# Patient Record
(demographics unavailable — no encounter records)

---

## 2024-10-24 NOTE — PHYSICAL EXAM
[Well Developed] : well developed [Well Nourished] : well nourished [No Acute Distress] : no acute distress [Normal Conjunctiva] : normal conjunctiva [Normal Venous Pressure] : normal venous pressure [No Carotid Bruit] : no carotid bruit [Normal S1, S2] : normal S1, S2 [No Rub] : no rub [No Gallop] : no gallop [Murmur] : murmur [Clear Lung Fields] : clear lung fields [Good Air Entry] : good air entry [No Respiratory Distress] : no respiratory distress  [Soft] : abdomen soft [Non Tender] : non-tender [No Masses/organomegaly] : no masses/organomegaly [Normal Bowel Sounds] : normal bowel sounds [Normal Gait] : normal gait [No Cyanosis] : no cyanosis [No Clubbing] : no clubbing [No Varicosities] : no varicosities [Edema ___] : edema [unfilled] [No Rash] : no rash [No Skin Lesions] : no skin lesions [Moves all extremities] : moves all extremities [No Focal Deficits] : no focal deficits [Normal Speech] : normal speech [Alert and Oriented] : alert and oriented [Normal memory] : normal memory [No Oral Pallor] : no oral pallor [Exaggerated Use Of Accessory Muscles For Inspiration] : no accessory muscle use [Respiration, Rhythm And Depth] : normal respiratory rhythm and effort [Auscultation Breath Sounds / Voice Sounds] : lungs were clear to auscultation bilaterally [Gait - Sufficient For Exercise Testing] : the gait was sufficient for exercise testing [] : no ischemic changes [Normal Rate] : normal [Heart Rate ___] : [unfilled] bpm [Rhythm Regular] : regular [II] : a grade 2 [III] : a grade 3 [2+] : left 2+ [No Abnormalities] : the abdominal aorta was not enlarged and no bruit was heard [de-identified] : II/VI holosystolic murmur at apex [de-identified] : 1+ DP/PT [Click] : no click [Pericardial Rub] : no pericardial rub [Right Carotid Bruit] : no bruit heard over the right carotid [Left Carotid Bruit] : no bruit heard over the left carotid [Bruit] : no bruit heard [Rt] : no varicose veins of the right leg [Lt] : no varicose veins of the left leg

## 2024-10-24 NOTE — HISTORY OF PRESENT ILLNESS
[FreeTextEntry1] : LEATHA QUINTANILLA is a 77-year-old female here on 03/18/2021, 2 weeks after she was last seen. At her last office visit, her BP was above goal with discrepancies in readings between her left and right arms. The Metoprolol was increased, and a CTA of the chest was recommended.  Her QTc is to be monitored as she is did have prolonged QTc on Norpace and the dose was decreased.   She comes to the office today reporting that the CT scan that was ordered was denied.  She feels better than she did when she was here last since the medication was increased. She is able to walk longer than she did prior. She currently denies fever, chills, cough, loss of smell or taste, palpitations, angina, dyspnea, dizziness, lightheadedness, or syncope. She gets peripheral edema which is no better or worse than usual. It is gone as she awakens and increases as the day progresses. Denies bowel or bladder problems. She walks about a mile a day for exercise. Her energy level is a bit lower and feeling sleepy since increasing the metoprolol dose.  Her appetite is good. Taking all medications as prescribed.  Overall the patient is doing well.  No chest pain/tightness/discomfort.   The patient is feeling much better.  Walking longer distances.  Occasionally she has a fluttering sensation in her chest.  Had two episodes where she felt light-headed and dizzy.  One time walking in park and went down to tie her shoe and the second time after going up the stairs.  May have been dehydrated during those two events.  No other episodes of feeling light-headed or dizzy.   ============= 6/17/2021 She is feeling pretty good and happy with her progress.  The patient is walking and goes out every day.  No shortness of breath or dyspnea upon exertion.  No chest pain/tightness/discomfort, fevers, chills or sweats.  Develops some swelling in her legs as the day progresses.  It gets better as the day goes on.  She was drinking 2-3 Gatorade a day with improvement in her symptoms.  Reviewed BP/HR log.  She was started on  lisinopril for a few days.  ============= 7/15/2021 Overall doing well.  No chest pain/tightness/discomfort, shortness of breath, fevers, chills, sweats, light-headed sensation, dizziness or palpitations.  He had the family (children/grandchildren) for last 2 weeks and has been very active with them - [employer].  On Monday, she was fixing food and saw flashing lights in her right eye.  She saw her son and was evaluated in the ER.  Her BP was significantly elevated at that time.       ====== 8/12/2021 The patient took lisinopril 10mg PO QD with decrease in her SBP and she was feeling very unwell.  Decreased dose to 5mg PO QD and continued to have low blood pressure and felt very fatigue.  Discontinued the lisinopril.  No chest pain/tightness/discomfort, shortness of breath, light-headed sensation, dizziness or palpitations.  Over the last few weeks she was eating with greater indiscretion and drinking more water.  Went to [place] and was able to keep up with the family walking 4-5 miles a day and was feeling very.    ========= 11/11/2021 This past Sunday she went to out to dinner and when she came back home she had a syncopal event.  She passed out for a short amount of time and was caught by her .  It took her 30 minutes to recover during which time she had two glasses of water.  Has not had a similar episode for the last few years.  The patient has not had her device interrogated since July.    This entire week she has been feeling more winded when she ambulates.  Also notes some chest tightness.   Denies any immobility.   No blood in her stool or urine.  Not taking the lisinopril. Did not take BB on day of event because it causes her to be fatigued and she was going to sing in the choir.  =========== 12/28/2021 The patient has had a few episodes of feel light-headed and dizzy that she attributes to being dehydrated.  She is fatigued and dyspneic when climbing a flight of stairs but overall her symptoms are much improved to what she experienced a year ago.  The patient has some swelling in her legs that she attributes to the holidays and snacks.  Has not been following a low salt diet.  She is seeing an opthmalogist for her glaucoma (pressure has not been decreasing).  The patient has not followed up with an internist.    ============ 2/10/2022 Her son and family went to [facility].  During stay in [facility] that got COVID and had mild symptoms leading to a prolonged stay.  She is feeling pretty good.  No chest pain/tightness/discomfort, shortness of breath, light-headed sensation, dizziness or palpitations.  Notes that when dehydrated she feels worse.  No fevers, chills or sweats.  Reviewed home BP log with range primarily in the 120-140 with diastolic in the 70s to low 80s.  She feels that the medications make her feel sleepy.  She is going to [place] next week called device clinic and plans to bring her monitor.  Has been eating a lot of salt.  ============ 3/16/2022 On 2/2/2022 she woke up at night with abdominal discomfort and went to the bathroom.  After going to the bathroom she had BRBPR.  She went to ED and was started on antibiotics.  Two days later she woke up with shortness of breath and fluid bubbling sound in her lungs and she had diffuse pain.  Went back to the ER/[place] and was diagnosed with PND and colitis.  Found to be in CHF and was diuresed.  Went to her primary after discharge and was concerned and she then went to [facility] (hospitalized for 2 days).  Catheterization was performed that demonstrated nonobstructive CAD.  She was sick and could not walk.  The patient was weak.  Last night she sat up all night with bubbling sounds and shortness of breath.  Had significant swelling in her legs yesterday and the day before.  No recent fevers, chills or sweats.  Currently on metoprolol 25mg PO BID. For last two days she did not take aspirin.  Told not to take her aspirin for breast biopsy that is scheduled for next week.  Her weight has been between 172-175lbs at home.  ============ 3/24/2022  Feel much better than she was previously.  No shortness of breath.  No bubbling sensation in her chest.  Following a low salt diet.  Continues to have some swelling in her legs.  It is not worse.  Notes swelling primarily towards the end of the day.  No chest pain/tightness/discomfort, light-headed sensation or dizziness.  Currently on lasix 40mg daily.  She started on Saturday.  Her weight has been stable ranging between 169.4 to 172.8 with SBP in the 120s to 130s with diastolic 70s.  ================= 4/28/2022  On May 11th she is scheduled for lumpectomy.  She may received adjunct radiation following the procedure.  The patient reports that she is back to her clinical baseline.  Had an episode on Sunday/Monday of feeling unsteady and sick feeling that resolved after she drank some water.  Overall she is not having any shortness of breath, chest pain/tightness/discomfort, dizziness or palpitations.  No swelling in her legs in the morning.  No change in orthopnea or PND.  Her weight is stable.  Her SBP ranges from 120-140s with diastolic in 50-70s.  Her weight is stable at 171-173lb.  ============ 5/19/2022 She has been cooking her food without any salt.  Having more carbohydrates.  Her weight was 176lbs this morning.  Her same day procedure was last Wednesday.  She was told that her margins were clear.  She is going to see her oncologist next week.  Notes mild swelling in ankles in morning and worse as the day progresses.    ============== 6/23/2022 Since being started on anastrozole she has had a mild cough that is not productive.  The patient walks for at least 1 mile a day at a normal pace.  No chest pain/tightness/discomfort, shortness of breath, light-headed sensation, dizziness or palpitations.  Her weight is stable and ranges around 2lbs.  Her legs swell by the evening and by the morning the swelling has largely resolved.  Intermittently takes extra lasix.  Takes the lasix when she gains 2lbs in 48 hours.  Her baseline weight is 172lbs.  Taking all her medications as prescribed.  Reviewed systolic 110-130 with diastolic in the 70s.  =================== 9/22/2022 She has had radiation for her breast cancer.  In July she had an episode felt dizzy and kep going to the left and felt was going to the left.  Went to the ER.  They thought she had a stroke.  MRI was unremarkable with no evidence of stroke.  ?inner ear.  She saw Dr. Greeen.  He recommended that a stress echocardiogram test.  Waiting for follow-up.  Continues to have difficulty climbing up stairs.  When she rests prior to going up the stairs does not have symptoms.  Went to ER another time and was having a severe coughing spasm.  It was unclear what was the cause.  No heart failure/PNA.  The week before she was treated for sinusitis with a Z eugenie.  Her weight is stable.   Her weight has been between 172-175lbs at home.  ================== 10/20/2022 The patients son is getting  next week.  His fiancee for whom they have been together with was recently diagnosed with breast cancer and is to be started on therapy.  Reports that overall that she is doing "okay."  The patient had a review by [facility] and they recommended that she be taken off of the verapamil.  The patient had a stress echocardiogram and reports that she did not have any significant dyspnea.  Her weight is stable.  Reviewed study with the patient.  =================== 12/1/2022 Started on mavacamtem.  Had a few episodes when laying down had significant pounding in her heart that occurred over three nights (Thanksgiving night and two days after 24-26 with SBP in the 100s).  No symptoms during the day.  She called Dr. Greene and was told that it had nothing to do to the medication.  It has resolved without any intervention.  No change in orthopnea or PND.  Continues to have swelling in her legs.  No change in her weight.  No change in her shortness of breath or dyspnea upon exertion.  One of her guests for Thanksgiving came donw with COVID.  She tested negative.  =================== 2/2/2023 She is doing pretty well.  In the last two weeks she has been experiencing a lot of fluttering sensations.  No other associated symptoms.  It has been occurring at rest and upon exertion.  Previously in the past it has been in the 60s.  No chest pain/tightness/discomfort or shortness of breath.  No change in her dyspnea upon exertion.  Overall her swelling has gradually improved.  No palpations or shocks noted.    ================== 4/13/2023 Patient is being followed closely at the hypertrophic cardiomyopathy clinic.  Follow-up EF demonstrates reduction of EF to approximately 50% with development of severe functional mitral valve regurgitation.  Patient was instructed by Dr. Greene to increase furosemide to 40 mg twice a day and hold camzyos.  Plan for repeat TTE in approximately 2 weeks.  She was 172lbs and this morning 165lbs.  The patient went to the ED on April 4th due to dyspnea upon exertion and fatigue.  Was told in heart failure and given IV furosemide and discharged from the hospital.  No chest pain/tightness/discomfort.  Feels very weak but not light-headed or dizzy.  Got blood work this morning.  Suffering from a headache.  Suffering from a lot of growling sound from her stomach.  -120mmHg with diastolic 60-70mmHg.  Her blood sugar has gone up.  Reviewed home BP/Hr log.  Repeat TTE scheduled for 4/28/2023.  Being seen by neurology for change in vision who would like her to get a MRI of the head.  Her voice is hoarse in the setting of her acute decompensation.  No sputum production, change in orthopnea or PND.  =========================== 9/28/2023 Last month had severe vertigo with significant nausea for which she was hospitalized for two days.  Unclear why it occurred. Seen by ENT.  Since then she has been doing well.  She is not having any cardiopulmonary complaints at rest or upon exertion.  No chest pain/tightness/discomfort, fevers, chills, sweats, light-headed sensation, dizziness or palpitations.  No change in baseline dyspnea upon exertion and mild swelling in legs that resolves by the morning.  She has glaucoma and suffering from ptosis, left greater than right.  The patient had COVID in August.  ================= 1/4/2024 She is not having any cardiopulmonary complaints at rest or upon exertion.  No chest pain/tightness/discomfort, fevers, chills, sweats, light-headed sensation, dizziness or palpitations.  Sometimes has some mild swelling in her ankles. Does morning stretches and walking.  Overall has been less active.  Her  is less active due to Parkinson's disease and worsening dementia.  She is the primary health aide.  No blood in her stool or urine.  Had a right eye infection - ?Staph and a stye that has resolved.  Taking all her medications as prescribed.    =============== 2/1/2024 Feeling well.  The patient reports that she has some dyspnea upon exertion that is worse compared to her symptoms when she last seen.  No chest pain/tightness/discomfort, fevers, chills, sweats, light-headed sensation, dizziness or palpitations.  No worsening swelling in her legs.  Mild edema reported around her ankles that has resolved by the morning.  No change in orthopnea, PND or lower extremity swelling.  Weight is 165lbs (lower than baseline).  Taking all medications as prescribed.  Husbands short term memory is getting worse.    =============== 2/1/2024 Feeling well.  No chest pain/tightness/discomfort, fevers, chills, sweats, light-headed sensation, dizziness or palpitations.  Denies any limitations in her activities.  No worsening swelling in her legs.  Mild edema reported around her ankles that resolves by the morning.  No change in orthopnea, PND or lower extremity swelling.  Her  is suffering from dad dementia.  She has a supportive family/kids.  She lost 8-10lbs.   ================= 9/5/2024 Seen by ortho.  Suffers from rattling sensation in her lower back for the past several years. She states she had sciatic pain of the RLE, treated with ESIs several years ago.  Diagnosed with lumbar spondylosis.  Medical management. Feeling well.  No chest pain/tightness/discomfort, fevers, chills, sweats, light-headed sensation, dizziness or palpitations.  Denies any limitations in her activities.  No worsening swelling in her legs.    =================== 10/24/2024 Status post ptosis surgery.  Notes pain in her right leg when she ambulates.  Sometimes feels weak in the right leg. She feels that it has been more common in nature.  Has not mentioned to her other doctotrs.   Diagnosed with lumbar spondylosis.  Feels that her shortness of breath is stable in nature.  No chest pain/tightness/discomfort, fevers, chills, sweats, light-headed sensation, dizziness or palpitations.  Denies any limitations in her activities.  Notes improvement in energy with going down on dose from metoprolol 100mmg PO BID to 50mg PO BID.  Assessment/plan --For weakness in her legs recommend she see a neurologist.  Will also get ELMER/PVR and arterial dopplers due to decreased pulses. --Had recent blood work drawn.  Asked it be sent to me for review. --Decrease to metoprolol tartrate 25mg PO BID from 50mg PO BID. --Continue zetia 10mg PO QD. --Continue Camzyos / mavacamtem 10mg daily. --Continue Entresto 24-26mg PO BID.  Tolerating it well.  Plan to uptitrate as tolerated.  Reviewed recent labs with the patient. --Continue lasix 40mg PO AM with potassium 20meq supplement. Recommend continue daily standing weight. --Reviewed with patient recent BP log and TTE.  TTE from 9/20/2024 demonstrated left ventricular systolic function is normal with an ejection fraction of 53 % by Maciel's method of disks. The left ventricular outflow tract resting gradient is 3 mmHg and 5 mmHg using the Valsalva maneuver. Device lead is visualized in the right heart. --Prior labs gone over. -- TTE from 6/27/2024 demonstrates Left ventricular cavity is normal in size. Left ventricular systolic function is normal with an ejection fraction of 54 % by Maciel's method of disks. The left ventricular outflow tract resting gradient is 4 mmHg and 5 mmHg using the Valsalva maneuver.  Device lead is visualized in the right heart.  There is mild mitral regurgitation.  Mild to moderate tricuspid regurgitation.  Trace aortic regurgitation. --Reviewed with patient TTE from 4/2/2024.  EF 58%.  No RMWA.  The LVOT resting gradient is 3mmHg and 5mmHg with Valsalvea maneuver (/72).   Next TTE in June.  Mild to moderate mitral regurgitation. --Her insurance denied Farxiga 10mg daily.  Indications for medications were gone over.  Asked patient to please send me BP/HR log in 10-14 days. --Continue anastrozole 1mg PO QD.  She is on year 2 of 5.  Potential cardiac side effects of anastrozole were gone over.  --Patient is clinically doing well. 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   --Discussed with patient what is mitral and tricuspid regurgitation.  The associated signs and symptoms were gone over.  The natural pathophysiology of untreated severe mitral and tricuspid and mitral regurgitation was gone over.  Various treatment options were gone over.  Recommend repeat TTE after 3 months of maximal GDMT. ---TE on 1/4/2024 demonstrated EF 61%.  There is severe mitral valve regurgitation.  The mechanism mitral valve regurgitation/Mg type I normal leaflet motion with dilated annulus.  The left ventricular outflow tract resting gradient of 3 mm and 5 mm using Valsalva.  Findings discussed with patient. --Reviewed TTE on 10/17/2023 with patient.  TTE on 7/25/2023 demonstrated systolic function is normal with an EF of 56%.  The left ventricular outflow track mean gradient is 6 mmHg and 7 mm using the Valsalva maneuver.  Mild to moderate mitral valve regurgitation. --4/10/2023 TTE was found to have mildly reduction of EF to 48% with severe mitral regurgitation with subsequent development of worsening heart failure symptoms.  Previously her calcium channel blocker was discontinued.  Will check labs and a chest x-ray.  --She appears euvolemic on examination.  --Revied home BP log with SBP primarily in 100-130 with diastolic in the 50-70. --She continues to be on anastrazole.  It was explained to the patient that anastrazole may lead to cardiomyopathy.  She was told to please inform her oncologist of her acute decompensation and reduced ejection fraction.  Previously discussed with patient that aromatase inhibitors such as anastrazole are associated with cardiomyopathy and increased risk of cardiovascular mortality. --Reviewed with patient TTE from 10/17/2023 that demonstrated severe TR with device in the right heart.  EF 55%.  LVOT gradient is 5mmHg at rest and 6mmHg with Valsalve.  Ild to moderate MR.  Mildly enlarged RV cavity size and systolic function.  Plan for repeat TTE later today. --TTE on 1/13/2023 demonstrated after 8 weeks following initiation of mavacamtem normal left ventricular systolic function.  No segmental wall motion abnormalities.  Mild mitral valve regurgitation.  EF 60%.  Peak LVOT gradient 17 mmHg.  Peak VLVOT gradient 70 mmHg. --Plan once relief of LVOT obstruction accomplished, would be able to use vasodilators to treat hypertension without exacerbating LVOT obstruction.  Previously felt unwell on low dose lisinopril 5mg/10mg.  Norvasc has been discontinued. --Patient around [place] had 1 episode of unprovoked syncope.  She discussed with Dr. Greene.  Recovered with no precipitating factors.  No further events.  Recommend Dr. Dyson/EP follow-up.  Last seen in December 2022. --Continue levothyroxine and has felt more energetic. --CTA from 8/25/2022 demonstrate no filling defects are seen within the main or proximal branch pulmonary arteries to/pulmonary embolism.  Heart remains enlarged.  Lobular prominence of the left adrenal gland probably reflective of an adrenal nodule.  Spiculated right breast mass measures up to 3.1 cm. --Reviewed home vital signs with -150 with diastolic 60s to 70s.  She is at her baseline weight. --Suffers from chronic mild lower extremity swelling.  Recommend compression stockings.  Follow a low salt diet and fluid restricting. --The patient is status post lumpectomy with sentinel node biopsy.  She has been started on anastrozole/adjunct therapy.  Plan for 5 years of treatment.  Has received radiation treatment.  Will need to get serial TTEs.  Asked patient to please have oncology notes sent to me for review. --Discontinued aspirin.  Catheterization performed recently in 2022 at [facility] demonstrated no coronary artery disease. --Discussed with patient various treatment options for her HOCM.  At this time the patient wishes to be treated medically.   If the patient does not respond to medical management or decompensates she will be referred for surgical intervention.  If deemed high risk will be assessed for alcohol septal ablation.  This was explained to the patient and her family.  --Repeat TTE on 11/15/2021 demonstrated EF 77%, peak aortic valve gradient 21, mean gradient 9, trace aortic valve regurgitation, peak left ventricular outflow tract gradient 21, mean gradient 25 and peak gradient 50 consistent with mild resting left ventricular outflow tract obstruction.  RVSP equal to 52 mmHg consistent with moderate pulmonary hypertension.  Moderate tricuspid valve regurgitation.  Asymmetric septal hypertrophy with resting gradient of 17 mmHg at rest which increases to 50 mmHg with Valsalva maneuver.  Moderate to severe mitral valve regurgitation. --Patient underwent a cardiac catheterization on 6/22/2018 that demonstrated right dominant coronary circulation.  Normal left main, left circumflex and right coronary artery.  Minor luminal irregularities within the mid left anterior descending artery.  Catheterization at [facility] in 2022 reported normal coronary arteries. --Reviewed with EP.  Recent device upload demonstrating no events and appropriate functioning of ICD. --Recommend again patient to follow a low/no salt diet.  Notes that when she does not eat chips/pretzels her blood pressure and swelling in her legs resolves without any intervention. --Reviewed BP/HR log with patient.  Her SBP is typically higher in the morning and decreases during the day.  Reviewed instructions on how to check BP/HR and send me log in next 1-2 weeks to further titrate medications. --Reviewed with patient duplex arterial ultrasound of her upper extremities.  No stenoses/occlusive disease noted. --The patient on 2/8/2021 underwent successful dual-chamber ICD/[employer] placement. The patient tolerated the procedure well. Chest x-ray demonstrated appropriate positioning of device with no pneumothorax. --Discontinued Norpace 100 mg p.o. twice daily following initiation of mavacamten.  Side effects of Norpace were reviewed.  Continue to trend QTc. --Ms. Quintanilla was previously counseled that first-degree relatives should undergo clinical screening with ECG and TTE followed by periodic follow-up every 3 to 5 years or if clinical status changes. Her son lives in [place] and has similar symptoms. She has 4 children and sister. She had another sister who passed from a heart attack at the age of 68. She had breast cancer with associated cardiomyopathy secondary to adjunct chemo medications. --Recommended previously that the patient undergo genetic testing. Some genetic mutations are associated with a higher prevalence of sudden cardiac death. --The patient was told to avoid dehydration. She drinks tea - 1 cup a day. She tries to primarily drink decaf tea. --She was discouraged to perform strenuous exercises. The patient does stretches, yoga positions and crunches a day along with ambulating 1 mile a day.  Reminded low sodium diet. --She was asked to please continue to keep a BP/HR and weight log. Instructions on how to check her BP/HR was reviewed with the patient. --EKG performed due to history of hypertension and hypertrophic obstructive cardiomyopathy.  EKG reviewed with patient.    All questions and concerns were addressed.    Neurologist. Will also get ELMER/PVR and arterial dopplers due to decreased pulses. Had recent blood work drawn.  Asked it be sent to me for review. Decrease to metoprolol tartrate 25mg PO BID.  Follow-up 3-4 months.   diagnosed with Parkinson's and dementia.  Findings and plans discussed with HCM specialist/Dr. Greene.  PCP: Dr. Calix CARD: Dr. Hernandez EP: Dr. Dyson

## 2024-10-24 NOTE — REVIEW OF SYSTEMS
[Lower Ext Edema] : lower extremity edema [Under Stress] : under stress [Negative] : Heme/Lymph [Feeling Fatigued] : not feeling fatigued [SOB] : no shortness of breath [Dyspnea on exertion] : not dyspnea during exertion [Palpitations] : no palpitations [Dizziness] : no dizziness [FreeTextEntry3] : See HPI [FreeTextEntry4] : See HPI [FreeTextEntry9] : See HPI

## 2024-10-24 NOTE — REASON FOR VISIT
[Follow-Up - Clinic] : a clinic follow-up of [Aortic Stenosis] : aortic stenosis [Fatigue] : feeling tired (fatigue) [Hyperlipidemia] : hyperlipidemia [Hypertension] : hypertension [Medication Management] : Medication management [FreeTextEntry1] : hypertrophic obstructive cardiomyopathy, discrepancy in blood pressure in upper extremiteis

## 2025-02-28 NOTE — HISTORY OF PRESENT ILLNESS
[FreeTextEntry1] : LEATHA QUINTANILLA is a 78-year-old female here on 03/18/2021, 2 weeks after she was last seen. At her last office visit, her BP was above goal with discrepancies in readings between her left and right arms. The Metoprolol was increased, and a CTA of the chest was recommended.  Her QTc is to be monitored as she is did have prolonged QTc on Norpace and the dose was decreased.   She comes to the office today reporting that the CT scan that was ordered was denied.  She feels better than she did when she was here last since the medication was increased. She is able to walk longer than she did prior. She currently denies fever, chills, cough, loss of smell or taste, palpitations, angina, dyspnea, dizziness, lightheadedness, or syncope. She gets peripheral edema which is no better or worse than usual. It is gone as she awakens and increases as the day progresses. Denies bowel or bladder problems. She walks about a mile a day for exercise. Her energy level is a bit lower and feeling sleepy since increasing the metoprolol dose.  Her appetite is good. Taking all medications as prescribed.  Overall the patient is doing well.  No chest pain/tightness/discomfort.   The patient is feeling much better.  Walking longer distances.  Occasionally she has a fluttering sensation in her chest.  Had two episodes where she felt light-headed and dizzy.  One time walking in park and went down to tie her shoe and the second time after going up the stairs.  May have been dehydrated during those two events.  No other episodes of feeling light-headed or dizzy.   ============= 6/17/2021 She is feeling pretty good and happy with her progress.  The patient is walking and goes out every day.  No shortness of breath or dyspnea upon exertion.  No chest pain/tightness/discomfort, fevers, chills or sweats.  Develops some swelling in her legs as the day progresses.  It gets better as the day goes on.  She was drinking 2-3 Gatorade a day with improvement in her symptoms.  Reviewed BP/HR log.  She was started on  lisinopril for a few days.  ============= 7/15/2021 Overall doing well.  No chest pain/tightness/discomfort, shortness of breath, fevers, chills, sweats, light-headed sensation, dizziness or palpitations.  He had the family (children/grandchildren) for last 2 weeks and has been very active with them - [employer].  On Monday, she was fixing food and saw flashing lights in her right eye.  She saw her son and was evaluated in the ER.  Her BP was significantly elevated at that time.       ====== 8/12/2021 The patient took lisinopril 10mg PO QD with decrease in her SBP and she was feeling very unwell.  Decreased dose to 5mg PO QD and continued to have low blood pressure and felt very fatigue.  Discontinued the lisinopril.  No chest pain/tightness/discomfort, shortness of breath, light-headed sensation, dizziness or palpitations.  Over the last few weeks she was eating with greater indiscretion and drinking more water.  Went to [place] and was able to keep up with the family walking 4-5 miles a day and was feeling very.    ========= 11/11/2021 This past Sunday she went to out to dinner and when she came back home she had a syncopal event.  She passed out for a short amount of time and was caught by her .  It took her 30 minutes to recover during which time she had two glasses of water.  Has not had a similar episode for the last few years.  The patient has not had her device interrogated since July.    This entire week she has been feeling more winded when she ambulates.  Also notes some chest tightness.   Denies any immobility.   No blood in her stool or urine.  Not taking the lisinopril. Did not take BB on day of event because it causes her to be fatigued and she was going to sing in the choir.  =========== 12/28/2021 The patient has had a few episodes of feel light-headed and dizzy that she attributes to being dehydrated.  She is fatigued and dyspneic when climbing a flight of stairs but overall her symptoms are much improved to what she experienced a year ago.  The patient has some swelling in her legs that she attributes to the holidays and snacks.  Has not been following a low salt diet.  She is seeing an opthmalogist for her glaucoma (pressure has not been decreasing).  The patient has not followed up with an internist.    ============ 2/10/2022 Her son and family went to [facility].  During stay in [facility] that got COVID and had mild symptoms leading to a prolonged stay.  She is feeling pretty good.  No chest pain/tightness/discomfort, shortness of breath, light-headed sensation, dizziness or palpitations.  Notes that when dehydrated she feels worse.  No fevers, chills or sweats.  Reviewed home BP log with range primarily in the 120-140 with diastolic in the 70s to low 80s.  She feels that the medications make her feel sleepy.  She is going to [place] next week called device clinic and plans to bring her monitor.  Has been eating a lot of salt.  ============ 3/16/2022 On 2/2/2022 she woke up at night with abdominal discomfort and went to the bathroom.  After going to the bathroom she had BRBPR.  She went to ED and was started on antibiotics.  Two days later she woke up with shortness of breath and fluid bubbling sound in her lungs and she had diffuse pain.  Went back to the ER/[place] and was diagnosed with PND and colitis.  Found to be in CHF and was diuresed.  Went to her primary after discharge and was concerned and she then went to [facility] (hospitalized for 2 days).  Catheterization was performed that demonstrated nonobstructive CAD.  She was sick and could not walk.  The patient was weak.  Last night she sat up all night with bubbling sounds and shortness of breath.  Had significant swelling in her legs yesterday and the day before.  No recent fevers, chills or sweats.  Currently on metoprolol 25mg PO BID. For last two days she did not take aspirin.  Told not to take her aspirin for breast biopsy that is scheduled for next week.  Her weight has been between 172-175lbs at home.  ============ 3/24/2022  Feel much better than she was previously.  No shortness of breath.  No bubbling sensation in her chest.  Following a low salt diet.  Continues to have some swelling in her legs.  It is not worse.  Notes swelling primarily towards the end of the day.  No chest pain/tightness/discomfort, light-headed sensation or dizziness.  Currently on lasix 40mg daily.  She started on Saturday.  Her weight has been stable ranging between 169.4 to 172.8 with SBP in the 120s to 130s with diastolic 70s.  ================= 4/28/2022  On May 11th she is scheduled for lumpectomy.  She may received adjunct radiation following the procedure.  The patient reports that she is back to her clinical baseline.  Had an episode on Sunday/Monday of feeling unsteady and sick feeling that resolved after she drank some water.  Overall she is not having any shortness of breath, chest pain/tightness/discomfort, dizziness or palpitations.  No swelling in her legs in the morning.  No change in orthopnea or PND.  Her weight is stable.  Her SBP ranges from 120-140s with diastolic in 50-70s.  Her weight is stable at 171-173lb.  ============ 5/19/2022 She has been cooking her food without any salt.  Having more carbohydrates.  Her weight was 176lbs this morning.  Her same day procedure was last Wednesday.  She was told that her margins were clear.  She is going to see her oncologist next week.  Notes mild swelling in ankles in morning and worse as the day progresses.    ============== 6/23/2022 Since being started on anastrozole she has had a mild cough that is not productive.  The patient walks for at least 1 mile a day at a normal pace.  No chest pain/tightness/discomfort, shortness of breath, light-headed sensation, dizziness or palpitations.  Her weight is stable and ranges around 2lbs.  Her legs swell by the evening and by the morning the swelling has largely resolved.  Intermittently takes extra lasix.  Takes the lasix when she gains 2lbs in 48 hours.  Her baseline weight is 172lbs.  Taking all her medications as prescribed.  Reviewed systolic 110-130 with diastolic in the 70s.  =================== 9/22/2022 She has had radiation for her breast cancer.  In July she had an episode felt dizzy and kep going to the left and felt was going to the left.  Went to the ER.  They thought she had a stroke.  MRI was unremarkable with no evidence of stroke.  ?inner ear.  She saw Dr. Greene.  He recommended that a stress echocardiogram test.  Waiting for follow-up.  Continues to have difficulty climbing up stairs.  When she rests prior to going up the stairs does not have symptoms.  Went to ER another time and was having a severe coughing spasm.  It was unclear what was the cause.  No heart failure/PNA.  The week before she was treated for sinusitis with a Z eugenie.  Her weight is stable.   Her weight has been between 172-175lbs at home.  ================== 10/20/2022 The patients son is getting  next week.  His fiancee for whom they have been together with was recently diagnosed with breast cancer and is to be started on therapy.  Reports that overall that she is doing "okay."  The patient had a review by [facility] and they recommended that she be taken off of the verapamil.  The patient had a stress echocardiogram and reports that she did not have any significant dyspnea.  Her weight is stable.  Reviewed study with the patient.  =================== 12/1/2022 Started on mavacamtem.  Had a few episodes when laying down had significant pounding in her heart that occurred over three nights (Thanksgiving night and two days after 24-26 with SBP in the 100s).  No symptoms during the day.  She called Dr. Greene and was told that it had nothing to do to the medication.  It has resolved without any intervention.  No change in orthopnea or PND.  Continues to have swelling in her legs.  No change in her weight.  No change in her shortness of breath or dyspnea upon exertion.  One of her guests for Thanksgiving came donw with COVID.  She tested negative.  =================== 2/2/2023 She is doing pretty well.  In the last two weeks she has been experiencing a lot of fluttering sensations.  No other associated symptoms.  It has been occurring at rest and upon exertion.  Previously in the past it has been in the 60s.  No chest pain/tightness/discomfort or shortness of breath.  No change in her dyspnea upon exertion.  Overall her swelling has gradually improved.  No palpations or shocks noted.    ================== 4/13/2023 Patient is being followed closely at the hypertrophic cardiomyopathy clinic.  Follow-up EF demonstrates reduction of EF to approximately 50% with development of severe functional mitral valve regurgitation.  Patient was instructed by Dr. Greene to increase furosemide to 40 mg twice a day and hold camzyos.  Plan for repeat TTE in approximately 2 weeks.  She was 172lbs and this morning 165lbs.  The patient went to the ED on April 4th due to dyspnea upon exertion and fatigue.  Was told in heart failure and given IV furosemide and discharged from the hospital.  No chest pain/tightness/discomfort.  Feels very weak but not light-headed or dizzy.  Got blood work this morning.  Suffering from a headache.  Suffering from a lot of growling sound from her stomach.  -120mmHg with diastolic 60-70mmHg.  Her blood sugar has gone up.  Reviewed home BP/Hr log.  Repeat TTE scheduled for 4/28/2023.  Being seen by neurology for change in vision who would like her to get a MRI of the head.  Her voice is hoarse in the setting of her acute decompensation.  No sputum production, change in orthopnea or PND.  =========================== 9/28/2023 Last month had severe vertigo with significant nausea for which she was hospitalized for two days.  Unclear why it occurred. Seen by ENT.  Since then she has been doing well.  She is not having any cardiopulmonary complaints at rest or upon exertion.  No chest pain/tightness/discomfort, fevers, chills, sweats, light-headed sensation, dizziness or palpitations.  No change in baseline dyspnea upon exertion and mild swelling in legs that resolves by the morning.  She has glaucoma and suffering from ptosis, left greater than right.  The patient had COVID in August.  ================= 1/4/2024 She is not having any cardiopulmonary complaints at rest or upon exertion.  No chest pain/tightness/discomfort, fevers, chills, sweats, light-headed sensation, dizziness or palpitations.  Sometimes has some mild swelling in her ankles. Does morning stretches and walking.  Overall has been less active.  Her  is less active due to Parkinson's disease and worsening dementia.  She is the primary health aide.  No blood in her stool or urine.  Had a right eye infection - ?Staph and a stye that has resolved.  Taking all her medications as prescribed.    =============== 2/1/2024 Feeling well.  The patient reports that she has some dyspnea upon exertion that is worse compared to her symptoms when she last seen.  No chest pain/tightness/discomfort, fevers, chills, sweats, light-headed sensation, dizziness or palpitations.  No worsening swelling in her legs.  Mild edema reported around her ankles that has resolved by the morning.  No change in orthopnea, PND or lower extremity swelling.  Weight is 165lbs (lower than baseline).  Taking all medications as prescribed.  Husbands short term memory is getting worse.    =============== 2/1/2024 Feeling well.  No chest pain/tightness/discomfort, fevers, chills, sweats, light-headed sensation, dizziness or palpitations.  Denies any limitations in her activities.  No worsening swelling in her legs.  Mild edema reported around her ankles that resolves by the morning.  No change in orthopnea, PND or lower extremity swelling.  Her  is suffering from dad dementia.  She has a supportive family/kids.  She lost 8-10lbs.   ================= 9/5/2024 Seen by ortho.  Suffers from rattling sensation in her lower back for the past several years. She states she had sciatic pain of the RLE, treated with ESIs several years ago.  Diagnosed with lumbar spondylosis.  Medical management. Feeling well.  No chest pain/tightness/discomfort, fevers, chills, sweats, light-headed sensation, dizziness or palpitations.  Denies any limitations in her activities.  No worsening swelling in her legs.    =================== 10/24/2024 Status post ptosis surgery.  Notes pain in her right leg when she ambulates.  Sometimes feels weak in the right leg. She feels that it has been more common in nature.  Has not mentioned to her other doctors.   Diagnosed with lumbar spondylosis.  Feels that her shortness of breath is stable in nature.  No chest pain/tightness/discomfort, fevers, chills, sweats, light-headed sensation, dizziness or palpitations.  Denies any limitations in her activities.  Notes improvement in energy with going down on dose from metoprolol 100mmg PO BID to 50mg PO BID.  ====================== 2/27/2025 In December she had a cold.  Since then she has had a runny nose and notes that she has a cough and brings up phlegm that is beige in color.  She saw her PCP and was given an antibiotic which lead to minor improvement.  No chest pain/tightness/discomfort, fevers, chills, sweats, light-headed sensation, dizziness or palpitations.  Denies any limitations in her activities.  Continues to have dyspnea upon exertion that is unchanged.  She has no shortness of breath.  Notes swelling in her ankles that is more frequent.  Her weight has increased 3-4lbs. She is following a strict low salt diet.  Assessment/plan --The patient was asked to please send me her recent blood work for review.   --Plan for next two days to take Lasix 40mg PO QAM/20mg PO QPM with potassium supplements.  Patient will call early next week with an update if leads to any change in her clinical state.  --Continue metoprolol tartrate 25mg PO BID from 50mg PO BID. --Continue zetia 10mg PO QD. --Continue Camzyos / mavacamtem 10mg daily. --Continue Entresto 24-26mg PO BID.  Tolerating it well.  Plan to uptitrate as tolerated.  Reviewed recent labs with the patient. --Continue lasix 40mg PO AM with potassium 20meq supplement. Recommend continue daily standing weight. --Reviewed with patient recent BP log, labs and TTE.  -- Lower extremity arterial duplex study on 12/3/2024 demonstrated that arterial vascular disease is not identified in either the right or left lower extremities.  Findings discussed with the patient. -- Physiologic assessment of lower extremities in 12/17/2024 demonstrated that the right ELMER of 1.16 and the left ELMER of 1.13 are normal. The right TBI of 0.63 and the left TBI of 0.66 are slightly low, and coupled with the slightly reduced pulse volume recordings suggests minimal disease affecting the small arteries of both the right and left feet.  Findings discussed with the patient. --TTE on 1/10/2025 demonstrated left ventricular cavity is normal in size. Left ventricular systolic function is normal. There are no regional wall motion abnormalities seen. Systolic anterior motion of the mitral valve is seen without evidence of left ventricular outflow tract obstruction. Borderline pulmonary hypertension. Device lead is visualized in the right heart.  --TTE from 9/20/2024 demonstrated left ventricular systolic function is normal with an ejection fraction of 53 % by Maciel's method of disks. The left ventricular outflow tract resting gradient is 3 mmHg and 5 mmHg using the Valsalva maneuver. Device lead is visualized in the right heart. --TTE from 6/27/2024 demonstrates Left ventricular cavity is normal in size. Left ventricular systolic function is normal with an ejection fraction of 54 % by Maciel's method of disks. The left ventricular outflow tract resting gradient is 4 mmHg and 5 mmHg using the Valsalva maneuver.  Device lead is visualized in the right heart.  There is mild mitral regurgitation.  Mild to moderate tricuspid regurgitation.  Trace aortic regurgitation. --Reviewed with patient TTE from 4/2/2024.  EF 58%.  No RMWA.  The LVOT resting gradient is 3mmHg and 5mmHg with Valsalvea maneuver (/72).   Next TTE in June.  Mild to moderate mitral regurgitation. --Her insurance denied Farxiga 10mg daily.  Indications for medications were gone over.  Asked patient to please send me BP/HR log in 10-14 days. --Continue anastrozole 1mg PO QD.  She is on year 2 of 5.  Potential cardiac side effects of anastrozole were gone over.  --Patient is clinically doing well. 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   --Discussed with patient what is mitral and tricuspid regurgitation.  The associated signs and symptoms were gone over.  The natural pathophysiology of untreated severe mitral and tricuspid and mitral regurgitation was gone over.  Various treatment options were gone over.  Recommend repeat TTE after 3 months of maximal GDMT. ---TE on 1/4/2024 demonstrated EF 61%.  There is severe mitral valve regurgitation.  The mechanism mitral valve regurgitation/Mg type I normal leaflet motion with dilated annulus.  The left ventricular outflow tract resting gradient of 3 mm and 5 mm using Valsalva.  Findings discussed with patient. --Reviewed TTE on 10/17/2023 with patient.  TTE on 7/25/2023 demonstrated systolic function is normal with an EF of 56%.  The left ventricular outflow track mean gradient is 6 mmHg and 7 mm using the Valsalva maneuver.  Mild to moderate mitral valve regurgitation. --4/10/2023 TTE was found to have mildly reduction of EF to 48% with severe mitral regurgitation with subsequent development of worsening heart failure symptoms.  Previously her calcium channel blocker was discontinued.  Will check labs and a chest x-ray.  --She appears euvolemic on examination.  --Revied home BP log with SBP primarily in 100-130 with diastolic in the 50-70. --She continues to be on anastrazole.  It was explained to the patient that anastrazole may lead to cardiomyopathy.  She was told to please inform her oncologist of her acute decompensation and reduced ejection fraction.  Previously discussed with patient that aromatase inhibitors such as anastrazole are associated with cardiomyopathy and increased risk of cardiovascular mortality. --Reviewed with patient TTE from 10/17/2023 that demonstrated severe TR with device in the right heart.  EF 55%.  LVOT gradient is 5mmHg at rest and 6mmHg with Valsalve.  Ild to moderate MR.  Mildly enlarged RV cavity size and systolic function.  Plan for repeat TTE later today. --TTE on 1/13/2023 demonstrated after 8 weeks following initiation of mavacamtem normal left ventricular systolic function.  No segmental wall motion abnormalities.  Mild mitral valve regurgitation.  EF 60%.  Peak LVOT gradient 17 mmHg.  Peak VLVOT gradient 70 mmHg. --Plan once relief of LVOT obstruction accomplished, would be able to use vasodilators to treat hypertension without exacerbating LVOT obstruction.  Previously felt unwell on low dose lisinopril 5mg/10mg.  Norvasc has been discontinued. --Patient around [place] had 1 episode of unprovoked syncope.  She discussed with Dr. [place].  Recovered with no precipitating factors.  No further events.  Recommend Dr. Dyson/EP follow-up.  Last seen in December 2022. --Continue levothyroxine and has felt more energetic. --CTA from 8/25/2022 demonstrate no filling defects are seen within the main or proximal branch pulmonary arteries to/pulmonary embolism.  Heart remains enlarged.  Lobular prominence of the left adrenal gland probably reflective of an adrenal nodule.  Spiculated right breast mass measures up to 3.1 cm. --Reviewed home vital signs with -150 with diastolic 60s to 70s.  She is at her baseline weight. --Suffers from chronic mild lower extremity swelling.  Recommend compression stockings.  Follow a low salt diet and fluid restricting. --The patient is status post lumpectomy with sentinel node biopsy.  She has been started on anastrozole/adjunct therapy.  Plan for 5 years of treatment.  Has received radiation treatment.  Will need to get serial TTEs.  Asked patient to please have oncology notes sent to me for review. --Discontinued aspirin.  Catheterization performed recently in 2022 at [facility] demonstrated no coronary artery disease. --Discussed with patient various treatment options for her HOCM.  At this time the patient wishes to be treated medically.   If the patient does not respond to medical management or decompensates she will be referred for surgical intervention.  If deemed high risk will be assessed for alcohol septal ablation.  This was explained to the patient and her family.  --Repeat TTE on 11/15/2021 demonstrated EF 77%, peak aortic valve gradient 21, mean gradient 9, trace aortic valve regurgitation, peak left ventricular outflow tract gradient 21, mean gradient 25 and peak gradient 50 consistent with mild resting left ventricular outflow tract obstruction.  RVSP equal to 52 mmHg consistent with moderate pulmonary hypertension.  Moderate tricuspid valve regurgitation.  Asymmetric septal hypertrophy with resting gradient of 17 mmHg at rest which increases to 50 mmHg with Valsalva maneuver.  Moderate to severe mitral valve regurgitation. --Patient underwent a cardiac catheterization on 6/22/2018 that demonstrated right dominant coronary circulation.  Normal left main, left circumflex and right coronary artery.  Minor luminal irregularities within the mid left anterior descending artery.  Catheterization at [facility] in 2022 reported normal coronary arteries. --Reviewed with EP.  Recent device upload demonstrating no events and appropriate functioning of ICD. --Recommend again patient to follow a low/no salt diet.  Notes that when she does not eat chips/pretzels her blood pressure and swelling in her legs resolves without any intervention. --Reviewed BP/HR log with patient.  Her SBP is typically higher in the morning and decreases during the day.  Reviewed instructions on how to check BP/HR and send me log in next 1-2 weeks to further titrate medications. --Reviewed with patient duplex arterial ultrasound of her upper extremities.  No stenoses/occlusive disease noted. --The patient on 2/8/2021 underwent successful dual-chamber ICD/[employer] placement. The patient tolerated the procedure well. Chest x-ray demonstrated appropriate positioning of device with no pneumothorax. --Discontinued Norpace 100 mg p.o. twice daily following initiation of mavacamten.  Side effects of Norpace were reviewed.  Continue to trend QTc. --Ms. Quintanilla was previously counseled that first-degree relatives should undergo clinical screening with ECG and TTE followed by periodic follow-up every 3 to 5 years or if clinical status changes. Her son lives in [place] and has similar symptoms. She has 4 children and sister. She had another sister who passed from a heart attack at the age of 68. She had breast cancer with associated cardiomyopathy secondary to adjunct chemo medications. --Recommended previously that the patient undergo genetic testing. Some genetic mutations are associated with a higher prevalence of sudden cardiac death. --The patient was told to avoid dehydration. She drinks tea - 1 cup a day. She tries to primarily drink decaf tea. --She was discouraged to perform strenuous exercises. The patient does stretches, yoga positions and crunches a day along with ambulating 1 mile a day.  Reminded low sodium diet. --She was asked to please continue to keep a BP/HR and weight log. Instructions on how to check her BP/HR was reviewed with the patient. --EKG performed due to history of hypertension and hypertrophic obstructive cardiomyopathy.  EKG reviewed with patient.    All questions and concerns were addressed.     diagnosed with Parkinson's and dementia.

## 2025-02-28 NOTE — HISTORY OF PRESENT ILLNESS
[FreeTextEntry1] : LEATHA QUINTANILLA is a 78-year-old female here on 03/18/2021, 2 weeks after she was last seen. At her last office visit, her BP was above goal with discrepancies in readings between her left and right arms. The Metoprolol was increased, and a CTA of the chest was recommended.  Her QTc is to be monitored as she is did have prolonged QTc on Norpace and the dose was decreased.   She comes to the office today reporting that the CT scan that was ordered was denied.  She feels better than she did when she was here last since the medication was increased. She is able to walk longer than she did prior. She currently denies fever, chills, cough, loss of smell or taste, palpitations, angina, dyspnea, dizziness, lightheadedness, or syncope. She gets peripheral edema which is no better or worse than usual. It is gone as she awakens and increases as the day progresses. Denies bowel or bladder problems. She walks about a mile a day for exercise. Her energy level is a bit lower and feeling sleepy since increasing the metoprolol dose.  Her appetite is good. Taking all medications as prescribed.  Overall the patient is doing well.  No chest pain/tightness/discomfort.   The patient is feeling much better.  Walking longer distances.  Occasionally she has a fluttering sensation in her chest.  Had two episodes where she felt light-headed and dizzy.  One time walking in park and went down to tie her shoe and the second time after going up the stairs.  May have been dehydrated during those two events.  No other episodes of feeling light-headed or dizzy.   ============= 6/17/2021 She is feeling pretty good and happy with her progress.  The patient is walking and goes out every day.  No shortness of breath or dyspnea upon exertion.  No chest pain/tightness/discomfort, fevers, chills or sweats.  Develops some swelling in her legs as the day progresses.  It gets better as the day goes on.  She was drinking 2-3 Gatorade a day with improvement in her symptoms.  Reviewed BP/HR log.  She was started on  lisinopril for a few days.  ============= 7/15/2021 Overall doing well.  No chest pain/tightness/discomfort, shortness of breath, fevers, chills, sweats, light-headed sensation, dizziness or palpitations.  He had the family (children/grandchildren) for last 2 weeks and has been very active with them - [employer].  On Monday, she was fixing food and saw flashing lights in her right eye.  She saw her son and was evaluated in the ER.  Her BP was significantly elevated at that time.       ====== 8/12/2021 The patient took lisinopril 10mg PO QD with decrease in her SBP and she was feeling very unwell.  Decreased dose to 5mg PO QD and continued to have low blood pressure and felt very fatigue.  Discontinued the lisinopril.  No chest pain/tightness/discomfort, shortness of breath, light-headed sensation, dizziness or palpitations.  Over the last few weeks she was eating with greater indiscretion and drinking more water.  Went to [place] and was able to keep up with the family walking 4-5 miles a day and was feeling very.    ========= 11/11/2021 This past Sunday she went to out to dinner and when she came back home she had a syncopal event.  She passed out for a short amount of time and was caught by her .  It took her 30 minutes to recover during which time she had two glasses of water.  Has not had a similar episode for the last few years.  The patient has not had her device interrogated since July.    This entire week she has been feeling more winded when she ambulates.  Also notes some chest tightness.   Denies any immobility.   No blood in her stool or urine.  Not taking the lisinopril. Did not take BB on day of event because it causes her to be fatigued and she was going to sing in the choir.  =========== 12/28/2021 The patient has had a few episodes of feel light-headed and dizzy that she attributes to being dehydrated.  She is fatigued and dyspneic when climbing a flight of stairs but overall her symptoms are much improved to what she experienced a year ago.  The patient has some swelling in her legs that she attributes to the holidays and snacks.  Has not been following a low salt diet.  She is seeing an opthmalogist for her glaucoma (pressure has not been decreasing).  The patient has not followed up with an internist.    ============ 2/10/2022 Her son and family went to [facility].  During stay in [facility] that got COVID and had mild symptoms leading to a prolonged stay.  She is feeling pretty good.  No chest pain/tightness/discomfort, shortness of breath, light-headed sensation, dizziness or palpitations.  Notes that when dehydrated she feels worse.  No fevers, chills or sweats.  Reviewed home BP log with range primarily in the 120-140 with diastolic in the 70s to low 80s.  She feels that the medications make her feel sleepy.  She is going to [place] next week called device clinic and plans to bring her monitor.  Has been eating a lot of salt.  ============ 3/16/2022 On 2/2/2022 she woke up at night with abdominal discomfort and went to the bathroom.  After going to the bathroom she had BRBPR.  She went to ED and was started on antibiotics.  Two days later she woke up with shortness of breath and fluid bubbling sound in her lungs and she had diffuse pain.  Went back to the ER/[place] and was diagnosed with PND and colitis.  Found to be in CHF and was diuresed.  Went to her primary after discharge and was concerned and she then went to [facility] (hospitalized for 2 days).  Catheterization was performed that demonstrated nonobstructive CAD.  She was sick and could not walk.  The patient was weak.  Last night she sat up all night with bubbling sounds and shortness of breath.  Had significant swelling in her legs yesterday and the day before.  No recent fevers, chills or sweats.  Currently on metoprolol 25mg PO BID. For last two days she did not take aspirin.  Told not to take her aspirin for breast biopsy that is scheduled for next week.  Her weight has been between 172-175lbs at home.  ============ 3/24/2022  Feel much better than she was previously.  No shortness of breath.  No bubbling sensation in her chest.  Following a low salt diet.  Continues to have some swelling in her legs.  It is not worse.  Notes swelling primarily towards the end of the day.  No chest pain/tightness/discomfort, light-headed sensation or dizziness.  Currently on lasix 40mg daily.  She started on Saturday.  Her weight has been stable ranging between 169.4 to 172.8 with SBP in the 120s to 130s with diastolic 70s.  ================= 4/28/2022  On May 11th she is scheduled for lumpectomy.  She may received adjunct radiation following the procedure.  The patient reports that she is back to her clinical baseline.  Had an episode on Sunday/Monday of feeling unsteady and sick feeling that resolved after she drank some water.  Overall she is not having any shortness of breath, chest pain/tightness/discomfort, dizziness or palpitations.  No swelling in her legs in the morning.  No change in orthopnea or PND.  Her weight is stable.  Her SBP ranges from 120-140s with diastolic in 50-70s.  Her weight is stable at 171-173lb.  ============ 5/19/2022 She has been cooking her food without any salt.  Having more carbohydrates.  Her weight was 176lbs this morning.  Her same day procedure was last Wednesday.  She was told that her margins were clear.  She is going to see her oncologist next week.  Notes mild swelling in ankles in morning and worse as the day progresses.    ============== 6/23/2022 Since being started on anastrozole she has had a mild cough that is not productive.  The patient walks for at least 1 mile a day at a normal pace.  No chest pain/tightness/discomfort, shortness of breath, light-headed sensation, dizziness or palpitations.  Her weight is stable and ranges around 2lbs.  Her legs swell by the evening and by the morning the swelling has largely resolved.  Intermittently takes extra lasix.  Takes the lasix when she gains 2lbs in 48 hours.  Her baseline weight is 172lbs.  Taking all her medications as prescribed.  Reviewed systolic 110-130 with diastolic in the 70s.  =================== 9/22/2022 She has had radiation for her breast cancer.  In July she had an episode felt dizzy and kep going to the left and felt was going to the left.  Went to the ER.  They thought she had a stroke.  MRI was unremarkable with no evidence of stroke.  ?inner ear.  She saw Dr. Greene.  He recommended that a stress echocardiogram test.  Waiting for follow-up.  Continues to have difficulty climbing up stairs.  When she rests prior to going up the stairs does not have symptoms.  Went to ER another time and was having a severe coughing spasm.  It was unclear what was the cause.  No heart failure/PNA.  The week before she was treated for sinusitis with a Z eugenie.  Her weight is stable.   Her weight has been between 172-175lbs at home.  ================== 10/20/2022 The patients son is getting  next week.  His fiancee for whom they have been together with was recently diagnosed with breast cancer and is to be started on therapy.  Reports that overall that she is doing "okay."  The patient had a review by [facility] and they recommended that she be taken off of the verapamil.  The patient had a stress echocardiogram and reports that she did not have any significant dyspnea.  Her weight is stable.  Reviewed study with the patient.  =================== 12/1/2022 Started on mavacamtem.  Had a few episodes when laying down had significant pounding in her heart that occurred over three nights (Thanksgiving night and two days after 24-26 with SBP in the 100s).  No symptoms during the day.  She called Dr. Greene and was told that it had nothing to do to the medication.  It has resolved without any intervention.  No change in orthopnea or PND.  Continues to have swelling in her legs.  No change in her weight.  No change in her shortness of breath or dyspnea upon exertion.  One of her guests for Thanksgiving came donw with COVID.  She tested negative.  =================== 2/2/2023 She is doing pretty well.  In the last two weeks she has been experiencing a lot of fluttering sensations.  No other associated symptoms.  It has been occurring at rest and upon exertion.  Previously in the past it has been in the 60s.  No chest pain/tightness/discomfort or shortness of breath.  No change in her dyspnea upon exertion.  Overall her swelling has gradually improved.  No palpations or shocks noted.    ================== 4/13/2023 Patient is being followed closely at the hypertrophic cardiomyopathy clinic.  Follow-up EF demonstrates reduction of EF to approximately 50% with development of severe functional mitral valve regurgitation.  Patient was instructed by Dr. Greene to increase furosemide to 40 mg twice a day and hold camzyos.  Plan for repeat TTE in approximately 2 weeks.  She was 172lbs and this morning 165lbs.  The patient went to the ED on April 4th due to dyspnea upon exertion and fatigue.  Was told in heart failure and given IV furosemide and discharged from the hospital.  No chest pain/tightness/discomfort.  Feels very weak but not light-headed or dizzy.  Got blood work this morning.  Suffering from a headache.  Suffering from a lot of growling sound from her stomach.  -120mmHg with diastolic 60-70mmHg.  Her blood sugar has gone up.  Reviewed home BP/Hr log.  Repeat TTE scheduled for 4/28/2023.  Being seen by neurology for change in vision who would like her to get a MRI of the head.  Her voice is hoarse in the setting of her acute decompensation.  No sputum production, change in orthopnea or PND.  =========================== 9/28/2023 Last month had severe vertigo with significant nausea for which she was hospitalized for two days.  Unclear why it occurred. Seen by ENT.  Since then she has been doing well.  She is not having any cardiopulmonary complaints at rest or upon exertion.  No chest pain/tightness/discomfort, fevers, chills, sweats, light-headed sensation, dizziness or palpitations.  No change in baseline dyspnea upon exertion and mild swelling in legs that resolves by the morning.  She has glaucoma and suffering from ptosis, left greater than right.  The patient had COVID in August.  ================= 1/4/2024 She is not having any cardiopulmonary complaints at rest or upon exertion.  No chest pain/tightness/discomfort, fevers, chills, sweats, light-headed sensation, dizziness or palpitations.  Sometimes has some mild swelling in her ankles. Does morning stretches and walking.  Overall has been less active.  Her  is less active due to Parkinson's disease and worsening dementia.  She is the primary health aide.  No blood in her stool or urine.  Had a right eye infection - ?Staph and a stye that has resolved.  Taking all her medications as prescribed.    =============== 2/1/2024 Feeling well.  The patient reports that she has some dyspnea upon exertion that is worse compared to her symptoms when she last seen.  No chest pain/tightness/discomfort, fevers, chills, sweats, light-headed sensation, dizziness or palpitations.  No worsening swelling in her legs.  Mild edema reported around her ankles that has resolved by the morning.  No change in orthopnea, PND or lower extremity swelling.  Weight is 165lbs (lower than baseline).  Taking all medications as prescribed.  Husbands short term memory is getting worse.    =============== 2/1/2024 Feeling well.  No chest pain/tightness/discomfort, fevers, chills, sweats, light-headed sensation, dizziness or palpitations.  Denies any limitations in her activities.  No worsening swelling in her legs.  Mild edema reported around her ankles that resolves by the morning.  No change in orthopnea, PND or lower extremity swelling.  Her  is suffering from dad dementia.  She has a supportive family/kids.  She lost 8-10lbs.   ================= 9/5/2024 Seen by ortho.  Suffers from rattling sensation in her lower back for the past several years. She states she had sciatic pain of the RLE, treated with ESIs several years ago.  Diagnosed with lumbar spondylosis.  Medical management. Feeling well.  No chest pain/tightness/discomfort, fevers, chills, sweats, light-headed sensation, dizziness or palpitations.  Denies any limitations in her activities.  No worsening swelling in her legs.    =================== 10/24/2024 Status post ptosis surgery.  Notes pain in her right leg when she ambulates.  Sometimes feels weak in the right leg. She feels that it has been more common in nature.  Has not mentioned to her other doctors.   Diagnosed with lumbar spondylosis.  Feels that her shortness of breath is stable in nature.  No chest pain/tightness/discomfort, fevers, chills, sweats, light-headed sensation, dizziness or palpitations.  Denies any limitations in her activities.  Notes improvement in energy with going down on dose from metoprolol 100mmg PO BID to 50mg PO BID.  ====================== 2/27/2025 In December she had a cold.  Since then she has had a runny nose and notes that she has a cough and brings up phlegm that is beige in color.  She saw her PCP and was given an antibiotic which lead to minor improvement.  No chest pain/tightness/discomfort, fevers, chills, sweats, light-headed sensation, dizziness or palpitations.  Denies any limitations in her activities.  Continues to have dyspnea upon exertion that is unchanged.  She has no shortness of breath.  Notes swelling in her ankles that is more frequent.  Her weight has increased 3-4lbs. She is following a strict low salt diet.  Assessment/plan --The patient was asked to please send me her recent blood work for review.   --Plan for next two days to take Lasix 40mg PO QAM/20mg PO QPM with potassium supplements.  Patient will call early next week with an update if leads to any change in her clinical state.  --Continue metoprolol tartrate 25mg PO BID from 50mg PO BID. --Continue zetia 10mg PO QD. --Continue Camzyos / mavacamtem 10mg daily. --Continue Entresto 24-26mg PO BID.  Tolerating it well.  Plan to uptitrate as tolerated.  Reviewed recent labs with the patient. --Continue lasix 40mg PO AM with potassium 20meq supplement. Recommend continue daily standing weight. --Reviewed with patient recent BP log, labs and TTE.  -- Lower extremity arterial duplex study on 12/3/2024 demonstrated that arterial vascular disease is not identified in either the right or left lower extremities.  Findings discussed with the patient. -- Physiologic assessment of lower extremities in 12/17/2024 demonstrated that the right ELMER of 1.16 and the left ELMER of 1.13 are normal. The right TBI of 0.63 and the left TBI of 0.66 are slightly low, and coupled with the slightly reduced pulse volume recordings suggests minimal disease affecting the small arteries of both the right and left feet.  Findings discussed with the patient. --TTE on 1/10/2025 demonstrated left ventricular cavity is normal in size. Left ventricular systolic function is normal. There are no regional wall motion abnormalities seen. Systolic anterior motion of the mitral valve is seen without evidence of left ventricular outflow tract obstruction. Borderline pulmonary hypertension. Device lead is visualized in the right heart.  --TTE from 9/20/2024 demonstrated left ventricular systolic function is normal with an ejection fraction of 53 % by Maciel's method of disks. The left ventricular outflow tract resting gradient is 3 mmHg and 5 mmHg using the Valsalva maneuver. Device lead is visualized in the right heart. --TTE from 6/27/2024 demonstrates Left ventricular cavity is normal in size. Left ventricular systolic function is normal with an ejection fraction of 54 % by Maciel's method of disks. The left ventricular outflow tract resting gradient is 4 mmHg and 5 mmHg using the Valsalva maneuver.  Device lead is visualized in the right heart.  There is mild mitral regurgitation.  Mild to moderate tricuspid regurgitation.  Trace aortic regurgitation. --Reviewed with patient TTE from 4/2/2024.  EF 58%.  No RMWA.  The LVOT resting gradient is 3mmHg and 5mmHg with Valsalvea maneuver (/72).   Next TTE in June.  Mild to moderate mitral regurgitation. --Her insurance denied Farxiga 10mg daily.  Indications for medications were gone over.  Asked patient to please send me BP/HR log in 10-14 days. --Continue anastrozole 1mg PO QD.  She is on year 2 of 5.  Potential cardiac side effects of anastrozole were gone over.  --Patient is clinically doing well. 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   --Discussed with patient what is mitral and tricuspid regurgitation.  The associated signs and symptoms were gone over.  The natural pathophysiology of untreated severe mitral and tricuspid and mitral regurgitation was gone over.  Various treatment options were gone over.  Recommend repeat TTE after 3 months of maximal GDMT. ---TE on 1/4/2024 demonstrated EF 61%.  There is severe mitral valve regurgitation.  The mechanism mitral valve regurgitation/Mg type I normal leaflet motion with dilated annulus.  The left ventricular outflow tract resting gradient of 3 mm and 5 mm using Valsalva.  Findings discussed with patient. --Reviewed TTE on 10/17/2023 with patient.  TTE on 7/25/2023 demonstrated systolic function is normal with an EF of 56%.  The left ventricular outflow track mean gradient is 6 mmHg and 7 mm using the Valsalva maneuver.  Mild to moderate mitral valve regurgitation. --4/10/2023 TTE was found to have mildly reduction of EF to 48% with severe mitral regurgitation with subsequent development of worsening heart failure symptoms.  Previously her calcium channel blocker was discontinued.  Will check labs and a chest x-ray.  --She appears euvolemic on examination.  --Revied home BP log with SBP primarily in 100-130 with diastolic in the 50-70. --She continues to be on anastrazole.  It was explained to the patient that anastrazole may lead to cardiomyopathy.  She was told to please inform her oncologist of her acute decompensation and reduced ejection fraction.  Previously discussed with patient that aromatase inhibitors such as anastrazole are associated with cardiomyopathy and increased risk of cardiovascular mortality. --Reviewed with patient TTE from 10/17/2023 that demonstrated severe TR with device in the right heart.  EF 55%.  LVOT gradient is 5mmHg at rest and 6mmHg with Valsalve.  Ild to moderate MR.  Mildly enlarged RV cavity size and systolic function.  Plan for repeat TTE later today. --TTE on 1/13/2023 demonstrated after 8 weeks following initiation of mavacamtem normal left ventricular systolic function.  No segmental wall motion abnormalities.  Mild mitral valve regurgitation.  EF 60%.  Peak LVOT gradient 17 mmHg.  Peak VLVOT gradient 70 mmHg. --Plan once relief of LVOT obstruction accomplished, would be able to use vasodilators to treat hypertension without exacerbating LVOT obstruction.  Previously felt unwell on low dose lisinopril 5mg/10mg.  Norvasc has been discontinued. --Patient around [place] had 1 episode of unprovoked syncope.  She discussed with Dr. [place].  Recovered with no precipitating factors.  No further events.  Recommend Dr. Dyson/EP follow-up.  Last seen in December 2022. --Continue levothyroxine and has felt more energetic. --CTA from 8/25/2022 demonstrate no filling defects are seen within the main or proximal branch pulmonary arteries to/pulmonary embolism.  Heart remains enlarged.  Lobular prominence of the left adrenal gland probably reflective of an adrenal nodule.  Spiculated right breast mass measures up to 3.1 cm. --Reviewed home vital signs with -150 with diastolic 60s to 70s.  She is at her baseline weight. --Suffers from chronic mild lower extremity swelling.  Recommend compression stockings.  Follow a low salt diet and fluid restricting. --The patient is status post lumpectomy with sentinel node biopsy.  She has been started on anastrozole/adjunct therapy.  Plan for 5 years of treatment.  Has received radiation treatment.  Will need to get serial TTEs.  Asked patient to please have oncology notes sent to me for review. --Discontinued aspirin.  Catheterization performed recently in 2022 at [facility] demonstrated no coronary artery disease. --Discussed with patient various treatment options for her HOCM.  At this time the patient wishes to be treated medically.   If the patient does not respond to medical management or decompensates she will be referred for surgical intervention.  If deemed high risk will be assessed for alcohol septal ablation.  This was explained to the patient and her family.  --Repeat TTE on 11/15/2021 demonstrated EF 77%, peak aortic valve gradient 21, mean gradient 9, trace aortic valve regurgitation, peak left ventricular outflow tract gradient 21, mean gradient 25 and peak gradient 50 consistent with mild resting left ventricular outflow tract obstruction.  RVSP equal to 52 mmHg consistent with moderate pulmonary hypertension.  Moderate tricuspid valve regurgitation.  Asymmetric septal hypertrophy with resting gradient of 17 mmHg at rest which increases to 50 mmHg with Valsalva maneuver.  Moderate to severe mitral valve regurgitation. --Patient underwent a cardiac catheterization on 6/22/2018 that demonstrated right dominant coronary circulation.  Normal left main, left circumflex and right coronary artery.  Minor luminal irregularities within the mid left anterior descending artery.  Catheterization at [facility] in 2022 reported normal coronary arteries. --Reviewed with EP.  Recent device upload demonstrating no events and appropriate functioning of ICD. --Recommend again patient to follow a low/no salt diet.  Notes that when she does not eat chips/pretzels her blood pressure and swelling in her legs resolves without any intervention. --Reviewed BP/HR log with patient.  Her SBP is typically higher in the morning and decreases during the day.  Reviewed instructions on how to check BP/HR and send me log in next 1-2 weeks to further titrate medications. --Reviewed with patient duplex arterial ultrasound of her upper extremities.  No stenoses/occlusive disease noted. --The patient on 2/8/2021 underwent successful dual-chamber ICD/[employer] placement. The patient tolerated the procedure well. Chest x-ray demonstrated appropriate positioning of device with no pneumothorax. --Discontinued Norpace 100 mg p.o. twice daily following initiation of mavacamten.  Side effects of Norpace were reviewed.  Continue to trend QTc. --Ms. Quintanilla was previously counseled that first-degree relatives should undergo clinical screening with ECG and TTE followed by periodic follow-up every 3 to 5 years or if clinical status changes. Her son lives in [place] and has similar symptoms. She has 4 children and sister. She had another sister who passed from a heart attack at the age of 68. She had breast cancer with associated cardiomyopathy secondary to adjunct chemo medications. --Recommended previously that the patient undergo genetic testing. Some genetic mutations are associated with a higher prevalence of sudden cardiac death. --The patient was told to avoid dehydration. She drinks tea - 1 cup a day. She tries to primarily drink decaf tea. --She was discouraged to perform strenuous exercises. The patient does stretches, yoga positions and crunches a day along with ambulating 1 mile a day.  Reminded low sodium diet. --She was asked to please continue to keep a BP/HR and weight log. Instructions on how to check her BP/HR was reviewed with the patient. --EKG performed due to history of hypertension and hypertrophic obstructive cardiomyopathy.  EKG reviewed with patient.    All questions and concerns were addressed.     diagnosed with Parkinson's and dementia.

## 2025-02-28 NOTE — PHYSICAL EXAM
[Well Developed] : well developed [Well Nourished] : well nourished [No Acute Distress] : no acute distress [Normal Conjunctiva] : normal conjunctiva [Normal Venous Pressure] : normal venous pressure [No Carotid Bruit] : no carotid bruit [Normal S1, S2] : normal S1, S2 [No Rub] : no rub [No Gallop] : no gallop [Murmur] : murmur [Clear Lung Fields] : clear lung fields [Good Air Entry] : good air entry [No Respiratory Distress] : no respiratory distress  [Soft] : abdomen soft [Non Tender] : non-tender [No Masses/organomegaly] : no masses/organomegaly [Normal Bowel Sounds] : normal bowel sounds [Normal Gait] : normal gait [No Cyanosis] : no cyanosis [No Clubbing] : no clubbing [No Varicosities] : no varicosities [Edema ___] : edema [unfilled] [No Rash] : no rash [No Skin Lesions] : no skin lesions [Moves all extremities] : moves all extremities [No Focal Deficits] : no focal deficits [Normal Speech] : normal speech [Alert and Oriented] : alert and oriented [Normal memory] : normal memory [No Oral Pallor] : no oral pallor [Respiration, Rhythm And Depth] : normal respiratory rhythm and effort [Exaggerated Use Of Accessory Muscles For Inspiration] : no accessory muscle use [Auscultation Breath Sounds / Voice Sounds] : lungs were clear to auscultation bilaterally [Gait - Sufficient For Exercise Testing] : the gait was sufficient for exercise testing [] : no ischemic changes [Normal Rate] : normal [Heart Rate ___] : [unfilled] bpm [Rhythm Regular] : regular [II] : a grade 2 [III] : a grade 3 [2+] : left 2+ [1+] : left 1+ [No Abnormalities] : the abdominal aorta was not enlarged and no bruit was heard [de-identified] : I/VI holosystolic murmur at apex [de-identified] : 1+ DP/PT [Click] : no click [Pericardial Rub] : no pericardial rub [Right Carotid Bruit] : no bruit heard over the right carotid [Bruit] : no bruit heard [Left Carotid Bruit] : no bruit heard over the left carotid [Rt] : no varicose veins of the right leg [Lt] : no varicose veins of the left leg

## 2025-02-28 NOTE — PHYSICAL EXAM
[Well Developed] : well developed [Well Nourished] : well nourished [No Acute Distress] : no acute distress [Normal Conjunctiva] : normal conjunctiva [Normal Venous Pressure] : normal venous pressure [No Carotid Bruit] : no carotid bruit [Normal S1, S2] : normal S1, S2 [No Rub] : no rub [No Gallop] : no gallop [Murmur] : murmur [Clear Lung Fields] : clear lung fields [Good Air Entry] : good air entry [No Respiratory Distress] : no respiratory distress  [Soft] : abdomen soft [Non Tender] : non-tender [No Masses/organomegaly] : no masses/organomegaly [Normal Bowel Sounds] : normal bowel sounds [Normal Gait] : normal gait [No Cyanosis] : no cyanosis [No Clubbing] : no clubbing [No Varicosities] : no varicosities [Edema ___] : edema [unfilled] [No Rash] : no rash [No Skin Lesions] : no skin lesions [Moves all extremities] : moves all extremities [No Focal Deficits] : no focal deficits [Normal Speech] : normal speech [Alert and Oriented] : alert and oriented [Normal memory] : normal memory [No Oral Pallor] : no oral pallor [Respiration, Rhythm And Depth] : normal respiratory rhythm and effort [Exaggerated Use Of Accessory Muscles For Inspiration] : no accessory muscle use [Auscultation Breath Sounds / Voice Sounds] : lungs were clear to auscultation bilaterally [Gait - Sufficient For Exercise Testing] : the gait was sufficient for exercise testing [] : no ischemic changes [Normal Rate] : normal [Heart Rate ___] : [unfilled] bpm [Rhythm Regular] : regular [II] : a grade 2 [III] : a grade 3 [2+] : left 2+ [1+] : left 1+ [No Abnormalities] : the abdominal aorta was not enlarged and no bruit was heard [de-identified] : I/VI holosystolic murmur at apex [de-identified] : 1+ DP/PT [Click] : no click [Pericardial Rub] : no pericardial rub [Right Carotid Bruit] : no bruit heard over the right carotid [Bruit] : no bruit heard [Left Carotid Bruit] : no bruit heard over the left carotid [Rt] : no varicose veins of the right leg [Lt] : no varicose veins of the left leg

## 2025-02-28 NOTE — REVIEW OF SYSTEMS
[SOB] : shortness of breath [Dyspnea on exertion] : dyspnea during exertion [Negative] : Heme/Lymph [Feeling Fatigued] : not feeling fatigued [Lower Ext Edema] : lower extremity edema [Leg Claudication] : no intermittent leg claudication [Palpitations] : no palpitations [Myalgia] : no myalgia [Dizziness] : no dizziness [Anxiety] : anxiety [Under Stress] : not under stress [FreeTextEntry3] : See HPI [FreeTextEntry4] : See HPI [FreeTextEntry5] : See HPI [FreeTextEntry9] : See HPI